# Patient Record
Sex: MALE | Race: BLACK OR AFRICAN AMERICAN | NOT HISPANIC OR LATINO | Employment: UNEMPLOYED | URBAN - METROPOLITAN AREA
[De-identification: names, ages, dates, MRNs, and addresses within clinical notes are randomized per-mention and may not be internally consistent; named-entity substitution may affect disease eponyms.]

---

## 2021-08-30 ENCOUNTER — OFFICE VISIT (OUTPATIENT)
Dept: FAMILY MEDICINE CLINIC | Facility: CLINIC | Age: 21
End: 2021-08-30

## 2021-08-30 VITALS
HEIGHT: 68 IN | DIASTOLIC BLOOD PRESSURE: 76 MMHG | HEART RATE: 86 BPM | RESPIRATION RATE: 18 BRPM | SYSTOLIC BLOOD PRESSURE: 124 MMHG | OXYGEN SATURATION: 98 % | TEMPERATURE: 97.5 F | WEIGHT: 220 LBS | BODY MASS INDEX: 33.34 KG/M2

## 2021-08-30 DIAGNOSIS — Z11.59 NEED FOR HEPATITIS C SCREENING TEST: ICD-10-CM

## 2021-08-30 DIAGNOSIS — E66.9 OBESITY (BMI 30-39.9): Primary | ICD-10-CM

## 2021-08-30 DIAGNOSIS — Z11.4 SCREENING FOR HIV (HUMAN IMMUNODEFICIENCY VIRUS): ICD-10-CM

## 2021-08-30 PROCEDURE — 99203 OFFICE O/P NEW LOW 30 MIN: CPT | Performed by: FAMILY MEDICINE

## 2021-08-30 NOTE — PROGRESS NOTES
Assessment/Plan:    Obesity (BMI 30-39  9)  Assessment:   BMI 33 45, obese  Goal: BMI between 18 5 to 25  Plan  -Encourage the patient to loose at least 5 pounds before next visit  Counseled patient on the importance of working to achieve weight reduction goal  Discussed benefits of weight loss including prevention or control of comorbidities  Discussed role that balanced diet and daily activity play in weight reduction  Set up small attainable weight loss goals  Involve family, friends, and co-workers for support    -Exercise should be 30 minutes 5 times weekly of moderate intensity activity, brisk walking acceptable  -Recommended diet include mediterranean and DASH  Primary focus should be unprocessed foods, fresh fruits &  vegetables, plant based fats and protein, legumes, whole grain and nuts  Vegetables and fruit should make up 1/2 each meal  Limit red meats, fast food and eating out at restaurants  Diagnoses and all orders for this visit:    Obesity (BMI 30-39  9)    Screening for HIV (human immunodeficiency virus)  -     HIV 1/2 Antigen/Antibody (4th Generation) w Reflex [facility]; Future    Need for hepatitis C screening test  -     Hepatitis C antibody; Future          Subjective:      Patient ID: Corbin José is a 21 y o  male  CarlosGatito Rowe is a 21 y o  M with no significant past medical history, who presents to establish care  Denies fatigue, headaches, dizziness, blurred vision, nausea, palpitation, chest pain, SOB, urinary changes, weakness, bowel changes, sleep problems,  sick contacts or recent travel  Patient's medical conditions are stable unless noted otherwise above   Patient has not had any recent hospitalizations, or medical emergencies  Overall patient reports feeling well  Patient has no further complaints other than what is mentioned in the ROS   Smokes half a pack per day x 8 yrs, alcohol and illicit drug consumption, he is currently employed, at a factory,lives with significant other  The following portions of the patient's history were reviewed and updated as appropriate: allergies, current medications, past family history, past medical history, past social history, past surgical history and problem list     Review of Systems   Constitutional: Negative for activity change, chills, diaphoresis, fatigue, fever and unexpected weight change  Eyes: Negative for visual disturbance  Respiratory: Negative for cough, chest tightness, shortness of breath and wheezing  Cardiovascular: Negative for chest pain, palpitations and leg swelling  Gastrointestinal: Negative for abdominal distention, abdominal pain, anal bleeding, blood in stool, constipation, diarrhea, nausea, rectal pain and vomiting  Genitourinary: Negative for difficulty urinating, dysuria and flank pain  Skin: Negative for color change and pallor  Neurological: Negative for dizziness, speech difficulty and weakness  Psychiatric/Behavioral: Negative for sleep disturbance and suicidal ideas  Objective:      /76 (BP Location: Left arm, Patient Position: Sitting, Cuff Size: Standard)   Pulse 86   Temp 97 5 °F (36 4 °C) (Temporal)   Resp 18   Ht 5' 8" (1 727 m)   Wt 99 8 kg (220 lb)   SpO2 98%   BMI 33 45 kg/m²          Physical Exam  Vitals and nursing note reviewed  Constitutional:       General: He is not in acute distress  Appearance: He is well-developed  He is not ill-appearing, toxic-appearing or diaphoretic  HENT:      Head: Normocephalic and atraumatic  Eyes:      General: No scleral icterus  Extraocular Movements: Extraocular movements intact  Cardiovascular:      Rate and Rhythm: Normal rate and regular rhythm  No extrasystoles are present  Pulses:           Radial pulses are 2+ on the right side and 2+ on the left side  Heart sounds: Normal heart sounds, S1 normal and S2 normal  No murmur heard  No friction rub  No gallop      Pulmonary: Effort: Pulmonary effort is normal  No respiratory distress  Breath sounds: Normal breath sounds and air entry  Abdominal:      General: Bowel sounds are normal       Palpations: Abdomen is soft  There is no mass  Tenderness: There is no abdominal tenderness  There is no right CVA tenderness, left CVA tenderness, guarding or rebound  Musculoskeletal:         General: No swelling, tenderness, deformity or signs of injury  Normal range of motion  Cervical back: Normal range of motion  Right lower leg: No edema  Left lower leg: No edema  Feet:      Right foot:      Skin integrity: No ulcer, skin breakdown, erythema, warmth, callus or dry skin  Left foot:      Skin integrity: No ulcer, skin breakdown, erythema, warmth, callus or dry skin  Skin:     General: Skin is warm  Findings: No rash  Neurological:      General: No focal deficit present  Mental Status: He is alert

## 2021-08-30 NOTE — ASSESSMENT & PLAN NOTE
Assessment:   BMI 33 45, obese  Goal: BMI between 18 5 to 25  Plan  -Encourage the patient to loose at least 5 pounds before next visit  Counseled patient on the importance of working to achieve weight reduction goal  Discussed benefits of weight loss including prevention or control of comorbidities  Discussed role that balanced diet and daily activity play in weight reduction  Set up small attainable weight loss goals  Involve family, friends, and co-workers for support    -Exercise should be 30 minutes 5 times weekly of moderate intensity activity, brisk walking acceptable  -Recommended diet include mediterranean and DASH  Primary focus should be unprocessed foods, fresh fruits &  vegetables, plant based fats and protein, legumes, whole grain and nuts  Vegetables and fruit should make up 1/2 each meal  Limit red meats, fast food and eating out at restaurants

## 2021-11-30 ENCOUNTER — OFFICE VISIT (OUTPATIENT)
Dept: FAMILY MEDICINE CLINIC | Facility: CLINIC | Age: 21
End: 2021-11-30

## 2021-11-30 VITALS
OXYGEN SATURATION: 98 % | HEIGHT: 68 IN | BODY MASS INDEX: 34.51 KG/M2 | RESPIRATION RATE: 16 BRPM | DIASTOLIC BLOOD PRESSURE: 76 MMHG | WEIGHT: 227.7 LBS | TEMPERATURE: 98 F | HEART RATE: 94 BPM | SYSTOLIC BLOOD PRESSURE: 122 MMHG

## 2021-11-30 DIAGNOSIS — E66.9 OBESITY (BMI 30-39.9): Primary | ICD-10-CM

## 2021-11-30 DIAGNOSIS — Z23 NEED FOR VACCINATION: ICD-10-CM

## 2021-11-30 PROCEDURE — 90686 IIV4 VACC NO PRSV 0.5 ML IM: CPT | Performed by: FAMILY MEDICINE

## 2021-11-30 PROCEDURE — 90471 IMMUNIZATION ADMIN: CPT | Performed by: FAMILY MEDICINE

## 2021-11-30 PROCEDURE — 99213 OFFICE O/P EST LOW 20 MIN: CPT | Performed by: FAMILY MEDICINE

## 2022-04-29 ENCOUNTER — OFFICE VISIT (OUTPATIENT)
Dept: FAMILY MEDICINE CLINIC | Facility: CLINIC | Age: 22
End: 2022-04-29

## 2022-04-29 VITALS
RESPIRATION RATE: 16 BRPM | DIASTOLIC BLOOD PRESSURE: 74 MMHG | HEIGHT: 68 IN | SYSTOLIC BLOOD PRESSURE: 130 MMHG | TEMPERATURE: 97.5 F | OXYGEN SATURATION: 97 % | WEIGHT: 241.3 LBS | HEART RATE: 98 BPM | BODY MASS INDEX: 36.57 KG/M2

## 2022-04-29 DIAGNOSIS — Z02.4 DRIVER'S PERMIT PE (PHYSICAL EXAMINATION): Primary | ICD-10-CM

## 2022-04-29 PROCEDURE — 99499 UNLISTED E&M SERVICE: CPT | Performed by: FAMILY MEDICINE

## 2022-04-29 NOTE — PROGRESS NOTES
Chief Complaint   Patient presents with    DMV physical     Blood Pressure: 130/74, Pulse: 98, Respirations: 16, Temperature: 97 5 °F (36 4 °C), Temp Source: Temporal, SpO2: 97 %, Weight - Scale: 109 kg (241 lb 4 8 oz), Height: 5' 8" (172 7 cm)    Assessment/Plan  1  Normal 's physical   Reviewed medical conditions on 's physical form with patient in detail, all negative  Form completed and handed back to patient  Counseled on  safety, including seatbelts, driving while impaired, and distracted driving  The above was discussed in layman's terms  The patient was engaged using the shared-decision making process and verbalized understanding of the treatment plan  All questions were answered to the patient's satisfaction  There are no diagnoses linked to this encounter  Subjective/HPI  1  's physical     Review of Systems  Constitutional: Negative for activity change, appetite change, chills and fever  Respiratory: Negative for shortness of breath  Cardiovascular: Negative for chest pain  Gastrointestinal: Negative for blood in stool, nausea and vomiting  Genitourinary: Negative for difficulty urinating and dysuria  Psychiatric/Behavioral: Negative for behavioral problems  Pertinent items are noted in chief complaint and HPI  Social History   reports that he has been smoking cigarettes  He has never used smokeless tobacco     reports current alcohol use  reports no history of drug use  Objective  Physical Exam   Constitutional: He is oriented to person, place, and time  He appears well-developed  No distress  HENT:   Head: Normocephalic and atraumatic  Eyes: Conjunctivae are normal    Neck: Normal range of motion  Cardiovascular: Normal rate, regular rhythm and normal heart sounds  No murmur heard  Pulmonary/Chest: Effort normal and breath sounds normal  No respiratory distress  He has no wheezes  Musculoskeletal: Normal range of motion  Neurological: He is alert and oriented to person, place, and time  Coordination and gait normal    Psychiatric: His behavior is normal   denies suicidal homicidal, delusions or hallucinations   Nursing note and vitals reviewed  Chart Review/Health Maintenance    Depression Screening and Follow-up Plan: Patient was screened for depression during today's encounter  They screened negative with a PHQ-2 score of 0  No Known AllergiesNo current outpatient medications on file  Patient Active Problem List   Diagnosis    Screening for HIV (human immunodeficiency virus)    Need for hepatitis C screening test    Obesity (BMI 30-39  9)     No past surgical history on file  No family history on file    Health Maintenance   Topic Date Due    Hepatitis C Screening  Never done    COVID-19 Vaccine (1) Never done    HIV Screening  Never done    BMI: Followup Plan  Never done    Annual Physical  Never done    Depression Screening  08/30/2022    Pneumococcal Vaccine: Pediatrics (0 to 5 Years) and At-Risk Patients (6 to 59 Years) (1 of 2 - PPSV23) 08/30/2022 (Originally 10/21/2006)    DTaP,Tdap,and Td Vaccines (1 - Tdap) 08/30/2022 (Originally 10/21/2021)    HPV Vaccine (1 - Male 2-dose series) 08/30/2022 (Originally 10/21/2011)    BMI: Adult  04/29/2023    Influenza Vaccine  Completed    HIB Vaccine  Aged Out    Hepatitis B Vaccine  Aged Out    IPV Vaccine  Aged Out    Hepatitis A Vaccine  Aged Out    Meningococcal ACWY Vaccine  Aged Out

## 2022-07-13 ENCOUNTER — RA CDI HCC (OUTPATIENT)
Dept: OTHER | Facility: HOSPITAL | Age: 22
End: 2022-07-13

## 2022-07-13 NOTE — PROGRESS NOTES
[facility]       Chart reviewed, no opportunity found: CHART REVIEWED, NO OPPORTUNITY FOUND        Patients Insurance        Commercial Insurance: [address] [place] [address]

## 2022-07-22 ENCOUNTER — APPOINTMENT (OUTPATIENT)
Dept: LAB | Facility: CLINIC | Age: 22
End: 2022-07-22
Payer: COMMERCIAL

## 2022-07-22 ENCOUNTER — OFFICE VISIT (OUTPATIENT)
Dept: FAMILY MEDICINE CLINIC | Facility: CLINIC | Age: 22
End: 2022-07-22

## 2022-07-22 VITALS
SYSTOLIC BLOOD PRESSURE: 114 MMHG | HEIGHT: 68 IN | TEMPERATURE: 98.8 F | RESPIRATION RATE: 18 BRPM | HEART RATE: 79 BPM | DIASTOLIC BLOOD PRESSURE: 64 MMHG | WEIGHT: 238 LBS | OXYGEN SATURATION: 96 % | BODY MASS INDEX: 36.07 KG/M2

## 2022-07-22 DIAGNOSIS — Z11.4 SCREENING FOR HIV (HUMAN IMMUNODEFICIENCY VIRUS): ICD-10-CM

## 2022-07-22 DIAGNOSIS — F17.210 CIGARETTE NICOTINE DEPENDENCE WITHOUT COMPLICATION: ICD-10-CM

## 2022-07-22 DIAGNOSIS — E66.9 OBESITY (BMI 30-39.9): Primary | ICD-10-CM

## 2022-07-22 DIAGNOSIS — E66.9 OBESITY (BMI 30-39.9): ICD-10-CM

## 2022-07-22 DIAGNOSIS — Z11.59 NEED FOR HEPATITIS C SCREENING TEST: ICD-10-CM

## 2022-07-22 LAB
ALBUMIN SERPL BCP-MCNC: 4 G/DL (ref 3.5–5)
ALP SERPL-CCNC: 71 U/L (ref 46–116)
ALT SERPL W P-5'-P-CCNC: 75 U/L (ref 12–78)
ANION GAP SERPL CALCULATED.3IONS-SCNC: 7 MMOL/L (ref 4–13)
AST SERPL W P-5'-P-CCNC: 36 U/L (ref 5–45)
BILIRUB SERPL-MCNC: 0.44 MG/DL (ref 0.2–1)
BUN SERPL-MCNC: 19 MG/DL (ref 5–25)
CALCIUM SERPL-MCNC: 8.6 MG/DL (ref 8.3–10.1)
CHLORIDE SERPL-SCNC: 107 MMOL/L (ref 96–108)
CHOLEST SERPL-MCNC: 160 MG/DL
CO2 SERPL-SCNC: 25 MMOL/L (ref 21–32)
CREAT SERPL-MCNC: 0.93 MG/DL (ref 0.6–1.3)
GFR SERPL CREATININE-BSD FRML MDRD: 116 ML/MIN/1.73SQ M
GLUCOSE P FAST SERPL-MCNC: 87 MG/DL (ref 65–99)
HCV AB SER QL: NORMAL
HDLC SERPL-MCNC: 20 MG/DL
LDLC SERPL CALC-MCNC: 84 MG/DL (ref 0–100)
POTASSIUM SERPL-SCNC: 3.7 MMOL/L (ref 3.5–5.3)
PROT SERPL-MCNC: 7.7 G/DL (ref 6.4–8.4)
SODIUM SERPL-SCNC: 139 MMOL/L (ref 135–147)
TRIGL SERPL-MCNC: 278 MG/DL

## 2022-07-22 PROCEDURE — 3725F SCREEN DEPRESSION PERFORMED: CPT | Performed by: FAMILY MEDICINE

## 2022-07-22 PROCEDURE — 80053 COMPREHEN METABOLIC PANEL: CPT

## 2022-07-22 PROCEDURE — 36415 COLL VENOUS BLD VENIPUNCTURE: CPT

## 2022-07-22 PROCEDURE — 87389 HIV-1 AG W/HIV-1&-2 AB AG IA: CPT

## 2022-07-22 PROCEDURE — 99213 OFFICE O/P EST LOW 20 MIN: CPT | Performed by: FAMILY MEDICINE

## 2022-07-22 PROCEDURE — 80061 LIPID PANEL: CPT

## 2022-07-22 PROCEDURE — 86803 HEPATITIS C AB TEST: CPT

## 2022-07-22 PROCEDURE — 3008F BODY MASS INDEX DOCD: CPT | Performed by: FAMILY MEDICINE

## 2022-07-22 NOTE — ASSESSMENT & PLAN NOTE
Assessment:   BMI 36 19, obese  Goal: BMI between 18 5 to 25  Patient reports not being compliant with the diet and exercises  Patient referral to weight management at his last encounter,  Not compliant with recommendations      Plan  -Encourage the patient to loose at least 5 pounds before next visit  -Will refer the patient to [facility]  Counseled patient on the importance of working to achieve weight reduction goal  Discussed benefits of weight loss including prevention or control of comorbidities  Discussed role that balanced diet and daily activity play in weight reduction  Set up small attainable weight loss goals  Involve family, friends, and co-workers for support    -Exercise should be 30 minutes 5 times weekly of moderate intensity activity, brisk walking acceptable  -Recommended diet include mediterranean and DASH  Primary focus should be unprocessed foods, fresh fruits &  vegetables, plant based fats and protein, legumes, whole grain and nuts  Vegetables and fruit should make up 1/2 each meal  Limit red meats, fast food and eating out at restaurants

## 2022-07-22 NOTE — PROGRESS NOTES
Assessment/Plan:    Cigarette nicotine dependence without complication  Assessment & Plan  Currently smokes a pack a day  Discussed tobacco cessation  Discussed the effects of smoking on cardiovascular system, skin and lungs  Patient verbalized understanding and is ready to quit  Discussed tips for smoking cessation:  Set a quit date, Change environment (avoid tobacco exposure, avoid situations where you previously smoked), dispose of all cigarettes and ashtrays  Involve family, friends, and co-workers for support  Patient verbalized understanding  Plan  -nicotine patch 7/24hr   -reassess in 3 months  Obesity (BMI 30-39  9)  Assessment:   BMI 36 19, obese  Goal: BMI between 18 5 to 25  Patient reports not being compliant with the diet and exercises  Patient referral to weight management at his last encounter,  Not compliant with recommendations      Plan  -Encourage the patient to loose at least 5 pounds before next visit  -Will refer the patient to [facility] management  Counseled patient on the importance of working to achieve weight reduction goal  Discussed benefits of weight loss including prevention or control of comorbidities  Discussed role that balanced diet and daily activity play in weight reduction  Set up small attainable weight loss goals  Involve family, friends, and co-workers for support    -Exercise should be 30 minutes 5 times weekly of moderate intensity activity, brisk walking acceptable  -Recommended diet include mediterranean and DASH  Primary focus should be unprocessed foods, fresh fruits &  vegetables, plant based fats and protein, legumes, whole grain and nuts  Vegetables and fruit should make up 1/2 each meal  Limit red meats, fast food and eating out at restaurants  Diagnoses and all orders for this visit:    Obesity (BMI 30-39 9)  -     Lipid Panel with Direct LDL reflex; Future  -     Comprehensive metabolic panel;  Future    Cigarette nicotine dependence without complication  -     nicotine (NICODERM CQ) 7 mg/24hr TD 24 hr patch; Place 1 patch on the skin every 24 hours          Subjective:      Patient ID: Natalya Andres is a 24 y o  male  Kenia Rowe is a 21 y o  M with no significant past medical history, who presents for a F/U of his BMI  Patient was referral to weight management I his last encounter  But  Did receive schedule for his appointment reason why he would like to schedule a visit with weight management  Patient is and nicotine dependent, smokes 4 cigarettes per day and is ready to quit  No treatments try prior to this visit  Patient reports that he has been smoking since was 15years old  Denies fatigue, headaches, dizziness, blurred vision, nausea, palpitation, chest pain, SOB, urinary changes, weakness, bowel changes, sleep problems,  sick contacts or recent travel  Patient was recommended to loose at least 5 lb for this visit  No physically active  Patient's medical conditions are stable unless noted otherwise above   Patient has not had any recent hospitalizations, or medical emergencies  Overall patient reports feeling well  Patient has no further complaints other than what is mentioned in the ROS  The following portions of the patient's history were reviewed and updated as appropriate: allergies, current medications, past family history, past medical history, past social history, past surgical history and problem list     Review of Systems   Constitutional: Negative for chills and fever  HENT: Negative for ear pain and sore throat  Eyes: Negative for pain and visual disturbance  Respiratory: Negative for cough and shortness of breath  Cardiovascular: Negative for chest pain and palpitations  Gastrointestinal: Negative for abdominal pain and vomiting  Genitourinary: Negative for dysuria and hematuria  Musculoskeletal: Negative for arthralgias and back pain  Skin: Negative for color change and rash  Neurological: Negative for seizures and syncope  All other systems reviewed and are negative  Objective:      /64 (BP Location: Left arm, Patient Position: Sitting, Cuff Size: Standard)   Pulse 79   Temp 98 8 °F (37 1 °C) (Temporal)   Resp 18   Ht 5' 8" (1 727 m)   Wt 108 kg (238 lb)   SpO2 96%   BMI 36 19 kg/m²          Physical Exam  Vitals and nursing note reviewed  Constitutional:       General: He is sleeping  He is not in acute distress  Appearance: He is well-developed  He is morbidly obese  He is not ill-appearing, toxic-appearing or diaphoretic  Comments: Patient appears to be tire, when ask reports that 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 but states that he is doing well  Patient reports that sleeps 6-8 hours every day  HENT:      Head: Normocephalic and atraumatic  Eyes:      General: No scleral icterus  Extraocular Movements: Extraocular movements intact  Cardiovascular:      Rate and Rhythm: Normal rate and regular rhythm  No extrasystoles are present  Pulses:           Radial pulses are 2+ on the right side and 2+ on the left side  Heart sounds: Normal heart sounds, S1 normal and S2 normal  No murmur heard  No friction rub  No gallop  Pulmonary:      Effort: Pulmonary effort is normal  No respiratory distress  Breath sounds: Normal breath sounds and air entry  Abdominal:      General: Bowel sounds are normal       Palpations: Abdomen is soft  There is no mass  Tenderness: There is no abdominal tenderness  There is no right CVA tenderness, left CVA tenderness, guarding or rebound  Musculoskeletal:         General: No swelling, tenderness, deformity or signs of injury  Normal range of motion  Cervical back: Normal range of motion  Right lower leg: No edema  Left lower leg: No edema  Feet:      Right foot:      Skin integrity: No ulcer, skin breakdown, erythema, warmth, callus or dry skin        Left foot: Skin integrity: No ulcer, skin breakdown, erythema, warmth, callus or dry skin  Skin:     General: Skin is warm  Findings: No rash  Neurological:      General: No focal deficit present

## 2022-07-22 NOTE — ASSESSMENT & PLAN NOTE
Assessment & Plan  Currently smokes a pack a day  Discussed tobacco cessation  Discussed the effects of smoking on cardiovascular system, skin and lungs  Patient verbalized understanding and is ready to quit  Discussed tips for smoking cessation:  Set a quit date, Change environment (avoid tobacco exposure, avoid situations where you previously smoked), dispose of all cigarettes and ashtrays  Involve family, friends, and co-workers for support  Patient verbalized understanding  Plan  -nicotine patch 7/24hr   -reassess in 3 months

## 2022-07-23 LAB — HIV 1+2 AB+HIV1 P24 AG SERPL QL IA: NORMAL

## 2022-10-12 PROBLEM — Z11.59 NEED FOR HEPATITIS C SCREENING TEST: Status: RESOLVED | Noted: 2021-08-30 | Resolved: 2022-10-12

## 2022-10-31 ENCOUNTER — PROCEDURE VISIT (OUTPATIENT)
Dept: FAMILY MEDICINE CLINIC | Facility: CLINIC | Age: 22
End: 2022-10-31

## 2022-10-31 VITALS
TEMPERATURE: 98.6 F | HEART RATE: 80 BPM | DIASTOLIC BLOOD PRESSURE: 80 MMHG | BODY MASS INDEX: 35.9 KG/M2 | HEIGHT: 68 IN | RESPIRATION RATE: 18 BRPM | WEIGHT: 236.9 LBS | OXYGEN SATURATION: 96 % | SYSTOLIC BLOOD PRESSURE: 110 MMHG

## 2022-10-31 DIAGNOSIS — L91.8 SKIN TAG: Primary | ICD-10-CM

## 2022-10-31 NOTE — PROGRESS NOTES
Name: Scott Hancock      : 2000      MRN: [id number]  Encounter Provider: Sophia Lewis MD  Encounter Date: 10/31/2022   Encounter department: [facility]     Please see procedure note below  Subjective      It was a pleasure to [address] is a 25 y  Ethyl Harrier with PMH significant for  Patient Active Problem List:     Obesity (BMI 30-39  9)     Cigarette nicotine dependence without complication  Here for skin tag removal     Today's main complain: none  Denies unintentional weight loss, fever, chills, fatigue, weakness, headaches, dizziness, rashes, blurred vision, nausea, palpitation, chest pain, SOB, abdominal pain, urinary changes, bowel changes, legs swelling/pain, sleep problems,  sick contacts or recent travel  Patient's medical conditions are stable unless noted otherwise above   Patient has not had any recent hospitalizations, or medical emergencies since last visit  Overall patient reports feeling well  Patient has no further complaints other than what is mentioned in the ROS  Review of Systems   Constitutional: Negative for chills and fever  HENT: Negative for ear pain and sore throat  Eyes: Negative for pain and visual disturbance  Respiratory: Negative for cough and shortness of breath  Cardiovascular: Negative for chest pain and palpitations  Gastrointestinal: Negative for abdominal pain and vomiting  Genitourinary: Negative for dysuria and hematuria  Musculoskeletal: Negative for arthralgias and back pain  Skin: Negative for color change and rash  Neurological: Negative for seizures and syncope  All other systems reviewed and are negative        Current Outpatient Medications on File Prior to Visit   Medication Sig   • nicotine (NICODERM CQ) 7 mg/24hr TD 24 hr patch Place 1 patch on the skin every 24 hours       Objective     /80 (BP Location: Left arm, Patient Position: Sitting, Cuff Size: Standard)   Pulse 80   Temp 98 6 °F (37 °C) (Temporal)   Resp 18   Ht 5' 8" (1 727 m)   Wt 107 kg (236 lb 14 4 oz)   SpO2 96%   BMI 36 02 kg/m²     Physical Exam  Vitals and nursing note reviewed  Constitutional:       General: He is not in acute distress  Appearance: He is well-developed  He is not ill-appearing, toxic-appearing or diaphoretic  HENT:      Head: Normocephalic and atraumatic  Comments: Skin tags  Eyes:      General: No scleral icterus  Extraocular Movements: Extraocular movements intact  Cardiovascular:      Rate and Rhythm: Normal rate and regular rhythm  No extrasystoles are present  Pulses:           Radial pulses are 2+ on the right side and 2+ on the left side  Heart sounds: Normal heart sounds, S1 normal and S2 normal  No murmur heard  No friction rub  No gallop  Pulmonary:      Effort: Pulmonary effort is normal  No respiratory distress  Breath sounds: Normal breath sounds and air entry  Abdominal:      General: Bowel sounds are normal       Palpations: Abdomen is soft  There is no mass  Tenderness: There is no abdominal tenderness  There is no right CVA tenderness, left CVA tenderness, guarding or rebound  Musculoskeletal:         General: No swelling, tenderness, deformity or signs of injury  Normal range of motion  Cervical back: Normal range of motion  Right lower leg: No edema  Left lower leg: No edema  Feet:      Right foot:      Skin integrity: No ulcer, skin breakdown, erythema, warmth, callus or dry skin  Left foot:      Skin integrity: No ulcer, skin breakdown, erythema, warmth, callus or dry skin  Skin:     General: Skin is warm  Findings: No rash  Comments: Seborrheic keratosis  Neurological:      General: No focal deficit present  Mental Status: He is alert         Skin tag removal    Date/Time: 10/31/2022 8:56 AM  Performed by: Stanislaw Justice MD  Authorized by: Segun Tam MD   Universal Protocol:  Consent: Verbal consent obtained  Written consent not obtained  Risks and benefits: risks, benefits and alternatives were discussed  Consent given by: patient  Timeout called at: 10/31/2022 8:56 AM   Patient understanding: patient states understanding of the procedure being performed  Patient consent: the patient's understanding of the procedure matches consent given  Procedure consent: procedure consent matches procedure scheduled  Relevant documents: relevant documents present and verified  Test results: test results available and properly labeled  Site marked: the operative site was marked  Radiology Images displayed and confirmed  If images not available, report reviewed: imaging studies available  Patient identity confirmed: verbally with patient        Procedure Details - Skin Tag Destruction:     Up to 15      Body area:  Head/neck    Head/neck location:  Neck    Malignancy: benign lesion      Destruction method: scissors used for extraction      Cosmetic?: Yes    Lesion 2:     Body area:  Head/neck    Head/neck location:  Neck    Malignancy: benign lesion      Destruction method: scissors used for extraction      Cosmetic?: Yes    Lesion 3:     Body area:  Head/neck    Head/neck location:  Neck    Malignancy: benign lesion      Destruction method: scissors used for extraction      Cosmetic?: Yes    Lesion 6:      no concerns      Procedure details:   Seborrheic keratosis removal   Body area: upper mid back   Malignancy: benign lesion   Destruction method: cryotherapy   Cosmetic: Yes   Patient reports no concerns       Segun Tam MD

## 2023-02-03 ENCOUNTER — RA CDI HCC (OUTPATIENT)
Dept: OTHER | Facility: HOSPITAL | Age: 23
End: 2023-02-03

## 2023-02-03 NOTE — PROGRESS NOTES
[facility]       Chart reviewed, no opportunity found: CHART REVIEWED, NO OPPORTUNITY FOUND        Patients Insurance        Commercial Insurance: [address] [place] [address]

## 2023-02-16 ENCOUNTER — OFFICE VISIT (OUTPATIENT)
Dept: FAMILY MEDICINE CLINIC | Facility: CLINIC | Age: 23
End: 2023-02-16

## 2023-02-16 VITALS
HEART RATE: 84 BPM | RESPIRATION RATE: 14 BRPM | TEMPERATURE: 97.6 F | OXYGEN SATURATION: 97 % | SYSTOLIC BLOOD PRESSURE: 112 MMHG | WEIGHT: 230.6 LBS | BODY MASS INDEX: 34.95 KG/M2 | DIASTOLIC BLOOD PRESSURE: 68 MMHG | HEIGHT: 68 IN

## 2023-02-16 DIAGNOSIS — Z00.00 PHYSICAL EXAM: Primary | ICD-10-CM

## 2023-02-16 DIAGNOSIS — E66.9 OBESITY (BMI 30-39.9): ICD-10-CM

## 2023-02-16 PROBLEM — E78.1 HYPERTRIGLYCERIDEMIA: Status: ACTIVE | Noted: 2023-02-16

## 2023-02-16 NOTE — PROGRESS NOTES
[address] [facility] FRANNY    NAME: Edgar Miller  AGE: 25 y o  SEX: male  : 2000     DATE: 2023     Assessment and Plan:     Problem List Items Addressed This Visit        Other    Obesity (BMI 30-39  9)     Assessment:   Last BMI 36 19, current BMI 35 06, improved, obese  Goal: BMI between 18 5 to 25  Plan  -Encourage the patient to loose at least 5 pounds before next visit  Counseled patient on the importance of working to achieve weight reduction goal  Discussed benefits of weight loss including prevention or control of comorbidities  Discussed role that balanced diet and daily activity play in weight reduction  Set up small attainable weight loss goals  Involve family, friends, and co-workers for support    -Exercise should be 30 minutes 5 times weekly of moderate intensity activity, brisk walking acceptable  -Recommended diet include mediterranean and DASH  Primary focus should be unprocessed foods, fresh fruits &  vegetables, plant based fats and protein, legumes, whole grain and nuts  Vegetables and fruit should make up 1/2 each meal  Limit red meats, fast food and eating out at restaurants  Relevant Orders    Comprehensive metabolic panel    Hemoglobin A1C   Other Visit Diagnoses     Physical exam    -  Primary    Relevant Orders    TDAP VACCINE GREATER THAN OR EQUAL TO 6YO IM    Pneumococcal Conjugate Vaccine 20-valent (Pcv20)          Immunizations and preventive care screenings were discussed with patient today  Appropriate education was printed on patient's after visit summary  Counseling:  Alcohol/drug use: discussed moderation in alcohol intake, the recommendations for healthy alcohol use, and avoidance of illicit drug use  Dental Health: discussed importance of regular tooth brushing, flossing, and dental visits    Injury prevention: discussed safety/seat belts, safety helmets, smoke detectors, carbon dioxide detectors, and smoking near bedding or upholstery  Sexual health: discussed sexually transmitted diseases, partner selection, use of condoms, avoidance of unintended pregnancy, and contraceptive alternatives  · Exercise: the importance of regular exercise/physical activity was discussed  Recommend exercise 3-5 times per week for at least 30 minutes  Return in about 3 months (around 5/16/2023) for Recheck bmi   Chief Complaint:     Chief Complaint   Patient presents with   • Physical Exam     24 y/o       History of Present Illness:     Adult Annual Physical   Patient here for a comprehensive physical exam  The patient reports no problems  Diet and Physical Activity  · Diet/Nutrition: well balanced diet  · Exercise: no formal exercise  Depression Screening  PHQ-2/9 Depression Screening    Little interest or pleasure in doing things: 0 - not at all  Feeling down, depressed, or hopeless: 0 - not at all  PHQ-2 Score: 0  PHQ-2 Interpretation: Negative depression screen       General Health  · Sleep: sleeps well  · Hearing: normal - bilateral   · Vision: no vision problems  · Dental: regular dental visits  ·      Review of Systems:     Review of Systems   Constitutional: Negative for chills and fever  HENT: Negative for ear pain and sore throat  Eyes: Negative for pain and visual disturbance  Respiratory: Negative for cough and shortness of breath  Cardiovascular: Negative for chest pain and palpitations  Gastrointestinal: Negative for abdominal pain and vomiting  Genitourinary: Negative for dysuria and hematuria  Musculoskeletal: Negative for arthralgias and back pain  Skin: Negative for color change and rash  Neurological: Negative for seizures and syncope  All other systems reviewed and are negative  Past Medical History:     History reviewed  No pertinent past medical history  Past Surgical History:     History reviewed   No pertinent surgical history  Social History:     Social History     Socioeconomic History   • Marital status: Single     Spouse name: None   • Number of children: None   • Years of education: None   • Highest education level: None   Occupational History   • None   Tobacco Use   • Smoking status: Every Day     Types: Cigarettes   • Smokeless tobacco: Never   Substance and Sexual Activity   • Alcohol use: Yes     Comment: occasionally   • Drug use: Never   • Sexual activity: Yes     Partners: Female     Birth control/protection: None   Other Topics Concern   • None   Social History Narrative   • None     Social Determinants of Health     Financial Resource Strain: Not on file   Food Insecurity: Not on file   Transportation Needs: No Transportation Needs   • Lack of Transportation (Medical): No   • Lack of Transportation (Non-Medical): No   Physical Activity: Not on file   Stress: Not on file   Social Connections: Not on file   Intimate Partner Violence: Not on file   Housing Stability: Not on file      Family History:     History reviewed  No pertinent family history  Current Medications:     Current Outpatient Medications   Medication Sig Dispense Refill   • nicotine (NICODERM CQ) 7 mg/24hr TD 24 hr patch Place 1 patch on the skin every 24 hours 90 patch 0     No current facility-administered medications for this visit  Allergies:     No Known Allergies   Physical Exam:     /68 (BP Location: Left arm, Patient Position: Sitting, Cuff Size: Adult)   Pulse 84   Temp 97 6 °F (36 4 °C) (Temporal)   Resp 14   Ht 5' 8" (1 727 m)   Wt 105 kg (230 lb 9 6 oz)   SpO2 97%   BMI 35 06 kg/m²     Physical Exam  Vitals and nursing note reviewed  Constitutional:       General: He is not in acute distress  Appearance: He is well-developed  He is obese  He is not ill-appearing, toxic-appearing or diaphoretic  HENT:      Head: Normocephalic and atraumatic  Eyes:      General: No scleral icterus  Extraocular Movements: Extraocular movements intact  Cardiovascular:      Rate and Rhythm: Normal rate and regular rhythm  No extrasystoles are present  Pulses:           Radial pulses are 2+ on the right side and 2+ on the left side  Heart sounds: Normal heart sounds, S1 normal and S2 normal  No murmur heard  No friction rub  No gallop  Pulmonary:      Effort: Pulmonary effort is normal  No respiratory distress  Breath sounds: Normal breath sounds and air entry  Abdominal:      General: Bowel sounds are normal       Palpations: Abdomen is soft  There is no mass  Tenderness: There is no abdominal tenderness  There is no right CVA tenderness, left CVA tenderness, guarding or rebound  Musculoskeletal:         General: No swelling, tenderness, deformity or signs of injury  Normal range of motion  Cervical back: Normal range of motion  Right lower leg: No edema  Left lower leg: No edema  Feet:      Right foot:      Skin integrity: No ulcer, skin breakdown, erythema, warmth, callus or dry skin  Left foot:      Skin integrity: No ulcer, skin breakdown, erythema, warmth, callus or dry skin  Skin:     General: Skin is warm  Findings: No rash  Neurological:      General: No focal deficit present  Mental Status: He is alert           MD Jim Cesar

## 2023-02-16 NOTE — ASSESSMENT & PLAN NOTE
Assessment:   Last BMI 36 19, current BMI 35 06, improved, obese  Goal: BMI between 18 5 to 25  Plan  -Encourage the patient to loose at least 5 pounds before next visit  Counseled patient on the importance of working to achieve weight reduction goal  Discussed benefits of weight loss including prevention or control of comorbidities  Discussed role that balanced diet and daily activity play in weight reduction  Set up small attainable weight loss goals  Involve family, friends, and co-workers for support    -Exercise should be 30 minutes 5 times weekly of moderate intensity activity, brisk walking acceptable  -Recommended diet include mediterranean and DASH  Primary focus should be unprocessed foods, fresh fruits &  vegetables, plant based fats and protein, legumes, whole grain and nuts  Vegetables and fruit should make up 1/2 each meal  Limit red meats, fast food and eating out at restaurants

## 2023-02-16 NOTE — ASSESSMENT & PLAN NOTE
Assessment  · Negative for Xanthomas or arcus cornealis   · Recently lipid panel:  Cholesterol   Date Value Ref Range Status   07/22/2022 160 See Comment mg/dL Final     Comment:     Cholesterol:         Pediatric <18 Years        Desirable          <170 mg/dL      Borderline High    170-199 mg/dL      High               >=200 mg/dL        Adult >=18 Years            Desirable        <200 mg/dL      Borderline High  200-239 mg/dL      High             >239 mg/dL     ·    Triglycerides   Date Value Ref Range Status   07/22/2022 278 (H) See Comment mg/dL Final     Comment:     Triglyceride:     0-9Y            <75mg/dL     10Y-17Y         <90 mg/dL       >=18Y     Normal          <150 mg/dL     Borderline High 150-199 mg/dL     High            200-499 mg/dL        Very High       >499 mg/dL    Specimen collection should occur prior to N-Acetylcysteine or Metamizole administration due to the potential for falsely depressed results  ·    HDL, Direct   Date Value Ref Range Status   07/22/2022 20 (L) >=40 mg/dL Final     Comment:     Specimen collection should occur prior to Metamizole administration due to the potential for falsley depressed results  ·    LDL Calculated   Date Value Ref Range Status   07/22/2022 84 0 - 100 mg/dL Final     Comment:     LDL Cholesterol:     Optimal           <100 mg/dl     Near Optimal      100-129 mg/dl     Above Optimal       Borderline High 130-159 mg/dl       High            160-189 mg/dl       Very High       >189 mg/dl         This screening LDL is a calculated result  It does not have the accuracy of the Direct Measured LDL in the monitoring of patients with hyperlipidemia and/or statin therapy  Direct Measure LDL (XSS391) must be ordered separately in these patients     ·   · Goal is cholesterol less than 200 mg/dl; LDL less than 100 mg/dl; HDL more than 40 mg/dl and triglycerides less than 159 mg/dl  · Not at goal   · Currently not on treatment       Plan:  · Will repeat lipid panel in 3 months  · Discussed implications of high cholesterol for cardiovascular health  Advised patient to maintain a low-fat, low-cholesterol diet  Encouraged consumption of more fruits and vegetables  Also advised that weight loss and exercise can help control cholesterol levels in addition to dietary changes

## 2023-11-04 ENCOUNTER — HOSPITAL ENCOUNTER (EMERGENCY)
Facility: HOSPITAL | Age: 23
Discharge: HOME | End: 2023-11-04
Attending: EMERGENCY MEDICINE
Payer: COMMERCIAL

## 2023-11-04 VITALS
DIASTOLIC BLOOD PRESSURE: 56 MMHG | BODY MASS INDEX: 33.31 KG/M2 | HEIGHT: 68 IN | WEIGHT: 219.8 LBS | RESPIRATION RATE: 18 BRPM | HEART RATE: 74 BPM | OXYGEN SATURATION: 97 % | SYSTOLIC BLOOD PRESSURE: 121 MMHG | TEMPERATURE: 98.4 F

## 2023-11-04 DIAGNOSIS — T16.9XXA FOREIGN BODY IN EAR: Primary | ICD-10-CM

## 2023-11-04 PROCEDURE — 99282 EMERGENCY DEPT VISIT SF MDM: CPT

## 2023-11-04 PROCEDURE — 69200 CLEAR OUTER EAR CANAL: CPT

## 2023-11-04 PROCEDURE — 99284 EMERGENCY DEPT VISIT MOD MDM: CPT

## 2023-11-04 RX ORDER — ACETAMINOPHEN 500 MG
500 TABLET ORAL EVERY 6 HOURS PRN
Qty: 60 TABLET | Refills: 0 | Status: SHIPPED | OUTPATIENT
Start: 2023-11-04

## 2023-11-04 RX ORDER — LIDOCAINE HYDROCHLORIDE 20 MG/ML
15 SOLUTION OROPHARYNGEAL ONCE
Status: COMPLETED | OUTPATIENT
Start: 2023-11-04 | End: 2023-11-04

## 2023-11-04 RX ORDER — IBUPROFEN 600 MG/1
600 TABLET ORAL EVERY 6 HOURS PRN
Qty: 30 TABLET | Refills: 0 | Status: SHIPPED | OUTPATIENT
Start: 2023-11-04

## 2023-11-04 RX ORDER — ACETAMINOPHEN 325 MG/1
975 TABLET ORAL ONCE
Status: COMPLETED | OUTPATIENT
Start: 2023-11-04 | End: 2023-11-04

## 2023-11-04 RX ORDER — OFLOXACIN 3 MG/ML
3 SOLUTION AURICULAR (OTIC) 3 TIMES DAILY
Qty: 5 ML | Refills: 0 | Status: SHIPPED | OUTPATIENT
Start: 2023-11-04 | End: 2023-11-10

## 2023-11-04 RX ORDER — IBUPROFEN 600 MG/1
600 TABLET ORAL ONCE
Status: COMPLETED | OUTPATIENT
Start: 2023-11-04 | End: 2023-11-04

## 2023-11-04 RX ADMIN — Medication 15 ML: at 11:24

## 2023-11-04 RX ADMIN — IBUPROFEN 600 MG: 600 TABLET, FILM COATED ORAL at 12:10

## 2023-11-04 RX ADMIN — ACETAMINOPHEN 325MG 975 MG: 325 TABLET ORAL at 12:10

## 2023-11-04 NOTE — CONSULTS
OTOLARYNGOLOGY CONSULT    Date of Service: 11/4/2023    Reason for consult: Bug in ear     ASSESSMENT/PLAN:  Caron Davila is a 21 y.o. male who we are consulted on for bug in ear. 2/3 of bug removed at bedside. Patient could not tolerate removal and view significantly obstructed by blood. -ofloxacin TID until follow-up  -follow-up Monday at [facility] office for removal with microscope and audio if appropriate  -rest of care per primary    HPI  This is a 20 yo male with no significant PMH presenting with feeling of something crawling in the ear. Lidocaine was used to kill bug successfully. Attempts made by ED for removal without success leading to bleeding from [facility]. Denies dizziness, reports decreased hearing    No other acute concerns. [facility]  No current facility-administered medications for this encounter. Current Outpatient Medications   Medication Sig Dispense Refill    nicotine (NICODERM CQ) 7 mg/24hr TD 24 hr patch Place 1 patch on the skin every 24 hours 90 patch 0       REVIEW OF SYSTEMS  As above    HISTORIES  PMH:  History reviewed. No pertinent past medical history. PSH:  History reviewed. No pertinent surgical history. SocHx:  Social History     Tobacco Use    Smoking status: Every Day     Types: Cigarettes    Smokeless tobacco: Never   Substance Use Topics    Alcohol use: Yes     Comment: occasionally    Drug use: Never       FH:  History reviewed. No pertinent family history.     ALLERGIES:  No Known Allergies    PHYSICAL EXAM  Visit Vitals  /56 (BP Location: Left arm)   Pulse 74   Temp 98.4 °F (36.9 °C) (Oral)   Resp 18   Ht 5' 8" (1.727 m)   Wt 99.7 kg (219 lb 12.8 oz)   SpO2 97%   BMI 33.42 kg/m²   Smoking Status Every Day   BSA 2.13 m²       General: NAD, AOx4  Eyes:  EOMI, PERRL  Ears:  External ears normal in appearance L EAC with bug and blood obstructing TM, removed 2/3 of bug, R ear WNL, TM clear  Nose:  External appearance normal, no septal deviation   Oral cavity:  No trismus, no mass/lesions  Neck: Trachea is midline; no thyroid nodules, Salivary glands symmetrical, no masses/abnormality on palpation  Lymph:  No cervical lymphadenopathy  Skin:  No obvious facial lesions  Neuro: Motor and sensory grossly intact. Face symmetrical, no obvious cranial nerve palsies,motor and sensory grossly intact, no focal deficits. Lungs:  Normal work of breathing, symmetrical chest expansion  Vascular: Well perfused      LABORATORY  Reviewed    PROCEDURES    PROCEDURE: Bug removal from ears  Impacted cerumen removal was performed by Dr. River Mcguire of the left ear. The visualization instrument used was the operating otoscope and speculum. The ear cleaning instrument used was 3 suction and alligator forceps. The outcome was not completely successful and the patient tolerated the procedure well. There were no complications. Bleeding of EAC was from prior removal attempts and was well controlled.      RADIOLOGY      Patient Active Problem List    Diagnosis Date Noted    Hypertriglyceridemia 02/16/2023    Cigarette nicotine dependence without complication 43/92/1848    Screening for HIV (human immunodeficiency virus) 08/30/2021    Obesity (BMI 30-39.9) 08/30/2021         Marbella Day, PGY2  Otolaryngology- Head and Neck Surgery  Please contact [phone] [facility] Text ENT resident role

## 2023-11-04 NOTE — DISCHARGE INSTRUCTIONS
-please use ofloxacin three times per day until follow up appointment   -follow up with ENT on Monday   -alternate with ibuprofen and tylenol every 3 hours for pain     -Por favor, use ofloxacino morris veces al día hasta la syl de seguimiento   -seguimiento con otorrinolaringólogo el lunes   -Alternar con ibuprofeno y Tylenol cada 3 horas para el dolor

## 2023-11-04 NOTE — Clinical Note
Lesa Tsai was seen and treated in our emergency department on 11/4/2023. Diagnosis:     Precious Reynoso  may return to work on return date. He may return on this date: 11/05/2023         If you have any questions or concerns, please don't hesitate to call.       Korina Lewis PA-C    ______________________________           _______________          _______________  Hospital Representative                              Date                                Time

## 2023-11-04 NOTE — ED PROVIDER NOTES
History  Chief Complaint   Patient presents with    Ear Problem     Patient states "there feels like something is moving in my ear". Started this morning, denies pain to area. 21 YOM presents with feeling something moving in his left ear, started this AM. Denies pain. Prior to Admission Medications   Prescriptions Last Dose Informant Patient Reported? Taking?   nicotine (NICODERM CQ) 7 mg/24hr TD 24 hr patch   No No   Sig: Place 1 patch on the skin every 24 hours      Facility-Administered Medications: None       History reviewed. No pertinent past medical history. History reviewed. No pertinent surgical history. History reviewed. No pertinent family history. I have reviewed and agree with the history as documented. E-Cigarette/Vaping     E-Cigarette/Vaping Substances     Social History     Tobacco Use    Smoking status: Every Day     Types: Cigarettes    Smokeless tobacco: Never   Substance Use Topics    Alcohol use: Yes     Comment: occasionally    Drug use: Never       Review of Systems   HENT:          FB sensation in left ear   All other systems reviewed and are negative. Physical Exam  Physical Exam  Vitals and nursing note reviewed. Constitutional:       General: He is not in acute distress. Appearance: Normal appearance. He is well-developed. He is not ill-appearing. HENT:      Head: Normocephalic and atraumatic. Left Ear: A foreign body is present. Ears:      Comments: There appears to be a bug in pt's left canal   Eyes:      Conjunctiva/sclera: Conjunctivae normal.   Cardiovascular:      Rate and Rhythm: Normal rate and regular rhythm. Heart sounds: No murmur heard. Pulmonary:      Effort: Pulmonary effort is normal.   Musculoskeletal:         General: Normal range of motion. Cervical back: Normal range of motion and neck supple. Skin:     General: Skin is warm and dry. Capillary Refill: Capillary refill takes less than 2 seconds. Neurological:      Mental Status: He is alert. Psychiatric:         Mood and Affect: Mood normal.         Behavior: Behavior normal.         Vital Signs  ED Triage Vitals [11/04/23 1114]   Temperature Pulse Respirations Blood Pressure SpO2   98.4 °F (36.9 °C) 74 18 121/56 97 %      Temp Source Heart Rate Source Patient Position - Orthostatic VS BP Location FiO2 (%)   Oral Monitor Sitting Left arm --      Pain Score       No Pain           Vitals:    11/04/23 1114   BP: 121/56   Pulse: 74   Patient Position - Orthostatic VS: Sitting         Visual Acuity      ED Medications  Medications   Lidocaine Viscous HCl (XYLOCAINE) 2 % mucosal solution 15 mL (15 mL Swish & Spit Given 11/4/23 1124)   ibuprofen (MOTRIN) tablet 600 mg (600 mg Oral Given 11/4/23 1210)   acetaminophen (TYLENOL) tablet 975 mg (975 mg Oral Given 11/4/23 1210)       Diagnostic Studies  Results Reviewed       None                   No orders to display              Procedures  Foreign Body - Orifice    Date/Time: 11/4/2023 11:59 AM    Performed by: Jay Plunkett PA-C  Authorized by: Jay Plunkett PA-C    Patient location:  ED  Other Assisting Provider: Yes (comment) (Dr. Hue Garcia)    Consent:     Consent obtained:  Verbal    Consent given by:  Patient    Risks discussed:  Bleeding, TM perforation, damage to surrounding structures, need for surgical removal, worsening of condition, pain and incomplete removal    Alternatives discussed:  No treatment  Universal protocol:     Procedure explained and questions answered to patient or proxy's satisfaction: yes      Immediately prior to procedure a time out was called: yes      Patient identity confirmed:  Verbally with patient and arm band  Location:     Location:  Ear    Ear location:  L ear  Pre-procedure details:     Imaging:  None  Anesthesia (see MAR for exact dosages):      Topical anesthetic:  Lidocaine gel  Procedure details:     Localization method:  Direct visualization    Removal mechanism:  Alligator forceps and irrigation    Procedure complexity:  Complex    Foreign bodies recovered:  None    Intact foreign body removal: no    Post-procedure details:     Confirmation:  Residual foreign bodies remain    Patient tolerance of procedure: Tolerated with difficulty    Complication (if applicable):  Bleeding  Comments:      Unable to remove bug            ED Course                                             Medical Decision Making  21 YOM presents with feeling something moving in his left ear, started this AM. Denies pain. There is a bug in pt's left ear canal. Bug was killed with visous lidocaine and multiple attempts were made to remove the bug with alligator forceps, regular forceps, irrigation and kyleigh suction however I was unsuccessful. Bleeding did occur in the canal due to the trauma. ENT was consulted and resident came and was able to remove 2/3 of the bug- pt will need to follow up on Monday for complete removal. Ofloxacin sent to the pharmacy. I have discussed the plan to discharge pt from ED. The patient was discharged in stable condition. Patient ambulated off the department. Extensive return to emergency department precautions were discussed. Follow up with appropriate providers including primary care physician was discussed. Patient and/or their  primary decision maker expressed understanding. Patient remained stable during entire emergency department stay. Problems Addressed:  Foreign body in ear: acute illness or injury    Risk  OTC drugs. Prescription drug management. Disposition  Final diagnoses:   Foreign body in ear     Time reflects when diagnosis was documented in both MDM as applicable and the Disposition within this note       Time User Action Codes Description Comment    11/4/2023  2:37 PM Chana Arvizu. [id number]] Foreign body in ear           ED Disposition       ED Disposition   Discharge    Condition   Stable    Date/Time   Sat Nov 4, 2023  2:56 PM    Comment   Marisa Guan discharge to home/self care. Follow-up Information       Follow up With Specialties Details Why Contact Info    Jesika Son MD Otolaryngology Schedule an appointment as soon as possible for a visit in 2 day(s)  [id number]  [facility] [address]  [address]              Discharge Medication List as of 11/4/2023  2:58 PM        START taking these medications    Details   acetaminophen (TYLENOL) 500 mg tablet Take 1 tablet (500 mg total) by mouth every 6 (six) hours as needed for mild pain, Starting Sat 11/4/2023, Normal      ibuprofen (MOTRIN) 600 mg tablet Take 1 tablet (600 mg total) by mouth every 6 (six) hours as needed for mild pain, Starting Sat 11/4/2023, Normal      ofloxacin (FLOXIN) 0.3 % otic solution Administer 3 drops into the left ear 3 (three) times a day for 11 days, Starting Sat 11/4/2023, Until Wed 11/15/2023, Normal           CONTINUE these medications which have NOT CHANGED    Details   nicotine (NICODERM CQ) 7 mg/24hr TD 24 hr patch Place 1 patch on the skin every 24 hours, Starting Fri 7/22/2022, Normal             No discharge procedures on file.     PDMP Review       None            ED Provider  Electronically Signed by             Colette Welch PA-C  11/04/23 [id number]